# Patient Record
Sex: FEMALE | URBAN - METROPOLITAN AREA
[De-identification: names, ages, dates, MRNs, and addresses within clinical notes are randomized per-mention and may not be internally consistent; named-entity substitution may affect disease eponyms.]

---

## 2017-03-08 ENCOUNTER — TELEPHONE (OUTPATIENT)
Dept: NEUROLOGY | Age: 82
End: 2017-03-08

## 2017-03-08 NOTE — TELEPHONE ENCOUNTER
----- Message from Shayy Vasquez sent at 3/6/2017  3:58 PM EST -----  Regarding: OGreta/Telephone  Message: Pt's daughter, So Stacy, calling because pt's physical therapist stated that she needs a hospital bed at home. She is wondering if NP Rita is the one would refer [email or web address]  Best contact [phone]